# Patient Record
Sex: FEMALE | Race: WHITE | URBAN - METROPOLITAN AREA
[De-identification: names, ages, dates, MRNs, and addresses within clinical notes are randomized per-mention and may not be internally consistent; named-entity substitution may affect disease eponyms.]

---

## 2020-09-04 ENCOUNTER — IMPORTED ENCOUNTER (OUTPATIENT)
Dept: URBAN - METROPOLITAN AREA CLINIC 50 | Facility: CLINIC | Age: 70
End: 2020-09-04

## 2020-09-04 NOTE — PATIENT DISCUSSION
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 Recommend ptosis visual field and photos at patient's convenience for possible blepharoplasty. Patient will be contacted with results.  """

## 2020-09-22 ENCOUNTER — IMPORTED ENCOUNTER (OUTPATIENT)
Dept: URBAN - METROPOLITAN AREA CLINIC 50 | Facility: CLINIC | Age: 70
End: 2020-09-22

## 2020-10-01 ENCOUNTER — IMPORTED ENCOUNTER (OUTPATIENT)
Dept: URBAN - METROPOLITAN AREA CLINIC 50 | Facility: CLINIC | Age: 70
End: 2020-10-01

## 2020-10-16 ENCOUNTER — IMPORTED ENCOUNTER (OUTPATIENT)
Dept: URBAN - METROPOLITAN AREA CLINIC 50 | Facility: CLINIC | Age: 70
End: 2020-10-16

## 2020-10-16 NOTE — PATIENT DISCUSSION
"""Recommend Bilateral Upper Lid Blepharoplasty 11/03/2020 at [facility].  Discussed with patient DEVENDRA

## 2020-11-12 ENCOUNTER — IMPORTED ENCOUNTER (OUTPATIENT)
Dept: URBAN - METROPOLITAN AREA CLINIC 50 | Facility: CLINIC | Age: 70
End: 2020-11-12

## 2020-12-04 ENCOUNTER — IMPORTED ENCOUNTER (OUTPATIENT)
Dept: URBAN - METROPOLITAN AREA CLINIC 50 | Facility: CLINIC | Age: 70
End: 2020-12-04

## 2020-12-04 NOTE — PATIENT DISCUSSION
"""S/P Blepharoplasty. Healed well.  Instructed patient to massage lids (patient states she can not ""

## 2020-12-30 ENCOUNTER — IMPORTED ENCOUNTER (OUTPATIENT)
Dept: URBAN - METROPOLITAN AREA CLINIC 50 | Facility: CLINIC | Age: 70
End: 2020-12-30

## 2021-02-04 ENCOUNTER — IMPORTED ENCOUNTER (OUTPATIENT)
Dept: URBAN - METROPOLITAN AREA CLINIC 50 | Facility: CLINIC | Age: 71
End: 2021-02-04

## 2021-04-23 ASSESSMENT — VISUAL ACUITY
OD_CC: J1@ 15 IN
OD_CC: 20/20
OD_SC: 20/50
OS_CC: 20/25-2
OS_SC: 20/30-2
OD_PH: 20/40
OS_PH: @ 15 IN
OS_PH: 20/30
OS_CC: J1+
OS_CC: 20/25
OS_SC: 20/40
OD_CC: 20/20
OD_SC: 20/40
OD_CC: J1+
OD_CC: 20/25-2
OD_PH: 20/30-1
OS_CC: 20/25-1
OD_PH: @ 15 IN
OS_PH: 20/30
OS_CC: J1@ 15 IN

## 2021-04-23 ASSESSMENT — TONOMETRY
OD_IOP_MMHG: 11
OS_IOP_MMHG: 10
OS_IOP_MMHG: 10
OD_IOP_MMHG: 10